# Patient Record
Sex: FEMALE | Race: WHITE | HISPANIC OR LATINO | Employment: UNEMPLOYED | URBAN - METROPOLITAN AREA
[De-identification: names, ages, dates, MRNs, and addresses within clinical notes are randomized per-mention and may not be internally consistent; named-entity substitution may affect disease eponyms.]

---
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
## 2024-01-06 ENCOUNTER — OFFICE VISIT (OUTPATIENT)
Dept: FAMILY MEDICINE | Facility: CLINIC | Age: 19
End: 2024-01-06
Payer: COMMERCIAL

## 2024-01-06 VITALS
WEIGHT: 196 LBS | HEART RATE: 54 BPM | DIASTOLIC BLOOD PRESSURE: 82 MMHG | RESPIRATION RATE: 14 BRPM | OXYGEN SATURATION: 100 % | SYSTOLIC BLOOD PRESSURE: 125 MMHG | TEMPERATURE: 97.8 F

## 2024-01-06 DIAGNOSIS — R23.8 SKIN IRRITATION: Primary | ICD-10-CM

## 2024-01-06 PROCEDURE — 99203 OFFICE O/P NEW LOW 30 MIN: CPT | Performed by: FAMILY MEDICINE

## 2024-01-06 NOTE — PROGRESS NOTES
Assessment & Plan     Skin irritation    No signs of infection noted today.    Recommend keeping area clean and dry.  Close Follow-up if no change or new or worsening sx prn.    Shannon Banegas MD  [facility]    Nettie Beard is a 18 year old, presenting for the following health issues:  Infection (DRAINAGE AROUND NAVEL )      HPI     Presents with friend- room reeks of THC when I enter.  States increased irritation around belly button x 2 weeks.  No piercings.  States the irritation has been coming and going.  Today it looks good per patient.  No redness or streaking.  No fevers.    Review of Systems   Constitutional, HEENT, cardiovascular, pulmonary, GI, , musculoskeletal, neuro, skin, endocrine and psych systems are negative, except as otherwise noted.      Objective    /82   Pulse 54   Temp 97.8  F (36.6  C) (Oral)   Resp 14   Wt 88.9 kg (196 lb)   SpO2 100%   There is no height or weight on file to calculate BMI.  Physical Exam   GENERAL: healthy, alert and no distress  EYES: Eyes grossly normal to inspection, PERRL and conjunctivae and sclerae normal  SKIN: normal umbilicus with no redness or drainage, no streaking noted around, no pain.  PSYCH: mentation appears normal, affect normal/bright

## 2024-06-30 ENCOUNTER — HOSPITAL ENCOUNTER (EMERGENCY)
Facility: CLINIC | Age: 19
Discharge: HOME | End: 2024-06-30
Attending: STUDENT IN AN ORGANIZED HEALTH CARE EDUCATION/TRAINING PROGRAM | Admitting: STUDENT IN AN ORGANIZED HEALTH CARE EDUCATION/TRAINING PROGRAM
Payer: COMMERCIAL

## 2024-06-30 VITALS
SYSTOLIC BLOOD PRESSURE: 140 MMHG | TEMPERATURE: 98 F | DIASTOLIC BLOOD PRESSURE: 80 MMHG | BODY MASS INDEX: 27.49 KG/M2 | WEIGHT: 165 LBS | HEART RATE: 107 BPM | HEIGHT: 65 IN | OXYGEN SATURATION: 100 % | RESPIRATION RATE: 16 BRPM

## 2024-06-30 DIAGNOSIS — S39.92XA INJURY OF COCCYX, INITIAL ENCOUNTER: ICD-10-CM

## 2024-06-30 PROCEDURE — 250N000013 HC RX MED GY IP 250 OP 250 PS 637: Performed by: STUDENT IN AN ORGANIZED HEALTH CARE EDUCATION/TRAINING PROGRAM

## 2024-06-30 PROCEDURE — 99283 EMERGENCY DEPT VISIT LOW MDM: CPT

## 2024-06-30 RX ORDER — ACETAMINOPHEN 325 MG/1
975 TABLET ORAL ONCE
Status: COMPLETED | OUTPATIENT
Start: 2024-06-30 | End: 2024-06-30

## 2024-06-30 RX ORDER — IBUPROFEN 600 MG/1
600 TABLET, FILM COATED ORAL ONCE
Status: COMPLETED | OUTPATIENT
Start: 2024-06-30 | End: 2024-06-30

## 2024-06-30 RX ADMIN — IBUPROFEN 600 MG: 600 TABLET ORAL at 01:19

## 2024-06-30 RX ADMIN — ACETAMINOPHEN 975 MG: 325 TABLET ORAL at 01:19

## 2024-06-30 ASSESSMENT — COLUMBIA-SUICIDE SEVERITY RATING SCALE - C-SSRS
2. HAVE YOU ACTUALLY HAD ANY THOUGHTS OF KILLING YOURSELF IN THE PAST MONTH?: NO
6. HAVE YOU EVER DONE ANYTHING, STARTED TO DO ANYTHING, OR PREPARED TO DO ANYTHING TO END YOUR LIFE?: NO
1. IN THE PAST MONTH, HAVE YOU WISHED YOU WERE DEAD OR WISHED YOU COULD GO TO SLEEP AND NOT WAKE UP?: NO

## 2024-06-30 ASSESSMENT — ACTIVITIES OF DAILY LIVING (ADL): ADLS_ACUITY_SCORE: 33

## 2024-06-30 NOTE — DISCHARGE INSTRUCTIONS
As we discussed, I suspect you have a tailbone injury.  This takes quite some time to heal.  Make sure you take ibuprofen and Tylenol at home for pain as well as ice your backside.  Follow-up with [facility] if things worsen in the next week or so.    Please take 500mg of tylenol every 4 hours as well as 600mg of ibuprofen every 6 hours for pain. Do not take more than 4,000mg of tylenol in 24hrs.

## 2024-06-30 NOTE — ED PROVIDER NOTES
Emergency Department Encounter         FINAL IMPRESSION:  Tailbone injury          ED COURSE AND MEDICAL DECISION MAKING          Healthy 19-year-old here after she slipped on some stairs falling onto her tailbone complaining of pain.  Initially had some mild paresthesias in her inner thighs after the event however no persistent neurocomplaints.  No fevers chills nausea vomiting.  Also complaining of left elbow pain.  No head injury or neck pain.    On arrival she looks well.  Vitals are stable.  Ambulatory.  Patient with very distal coccygeal/tailbone pain.  No midline sacral or lumbosacral back pain.  No midline thoracic or cervical pain.  She is a small scrape to her left elbow with full range of motion of the joint with no significant pain to palpation of the bony prominences.  Wrist and shoulder normal in that side.  Normal pulses.  Soft compartments.    Extensive bedside discussion regarding tailbone injuries.  Unlikely any other significant neuro injuries including central cord.  She has no other significant hard neurologic signs today including midline pain, paresthesias and also has normal strength.  No significant concerns for lumbar fracture.  I do suspect she injured her coccyx.  Again, no other indication for further imaging today.  Will have her follow-up with outpatient orthopedics.  As far as her elbow, no bony prominence pain, and with full range of motion with no pain, I suspect is a superficial injury.                         Medical Decision Making  Obtained supplemental history:Supplemental history obtained?: No  Reviewed external records: External records reviewed?: No  Care impacted by chronic illness:N/A  Care significantly affected by social determinants of health:N/A  Did you consider but not order tests?: Work up considered but not performed and documented in chart, if applicable  Did you interpret images independently?: Independent interpretation of ECG and images noted in  documentation, when applicable.  Consultation discussion with other provider:Did you involve another provider (consultant, , pharmacy, etc.)?: No  Discharge. No recommendations on prescription strength medication(s). See documentation for any additional details.              EKG        Critical Care     Performed by: Herbert Kim or    Authorized by: Herbert Kim  Total critical care time:  minutes  Critical care was necessary to treat or prevent imminent or life-threatening deterioration of the following conditions:   Critical care was time spent personally by me on the following activities: development of treatment plan with patient or surrogate, discussions with consultants, examination of patient, evaluation of patient's response to treatment, obtaining history from patient or surrogate, ordering and performing treatments and interventions, ordering and review of laboratory studies, ordering and review of radiographic studies, re-evaluation of patient's condition and monitoring for potential decompensation.  Critical care time was exclusive of separately billable procedures and treating other patients.'    At the conclusion of the encounter I discussed the results of all the tests and the disposition. The questions were answered. The patient or family acknowledged understanding and was agreeable with the care plan.        MEDICATIONS GIVEN IN THE EMERGENCY DEPARTMENT:  Medications   ibuprofen (ADVIL/MOTRIN) tablet 600 mg (has no administration in time range)   acetaminophen (TYLENOL) tablet 975 mg (has no administration in time range)       NEW PRESCRIPTIONS STARTED AT TODAY'S ED VISIT:  New Prescriptions    No medications on file       HPI     19-year-old healthy female here after she slipped injuring her tailbone.  Had a brief episode of paresthesias in her middle thighs however this was not consistent and constant and was a few seconds after the injury.  Complaining of coccygeal pain now.  No lumbosacral back pain.  " No cervical pain.  No head injury.  Also complaining of a scrape of her left elbow.  Normal wrist and shoulder.        MEDICAL HISTORY     Past Medical History:   Diagnosis Date    Anxiety 10/5/2021    Mild intermittent asthma without complication 2/22/2017    Moderate episode of recurrent major depressive disorder (H) 10/5/2021       No past surgical history on file.    Social History     Tobacco Use    Smoking status: Never    Smokeless tobacco: Never       oxyCODONE (ROXICODONE) 5 MG tablet  VENTOLIN  (90 Base) MCG/ACT inhaler            PHYSICAL EXAM     BP (!) 140/80   Pulse 107   Temp 98  F (36.7  C)   Resp 16   Ht 1.651 m (5' 5\")   Wt 74.8 kg (165 lb)   SpO2 100%   BMI 27.46 kg/m        PHYSICAL EXAM:     General: Patient appears well, nontoxic, comfortable  HEENT: Moist mucous membranes,  No head trauma.    Cardiovascular: Normal rate, normal rhythm, no extremity edema.  No appreciable murmur.  Respiratory: No signs of respiratory distress, lungs are clear to auscultation bilaterally with no wheezes rhonchi or rales.  Abdominal: Soft, nontender, nondistended, no palpable masses, no guarding, no rebound  Musculoskeletal: Full range of motion of joints, no deformities appreciated.Full range of motion of the left elbow, left shoulder and left wrist.  Small superficial abrasion to left elbow.  No significant pain at bony prominence palpation.  Pain to palpation of her coccygeal region with no significant skin changes.  No sacral or lumbosacral back pain with midline palpation, normal thoracic and cervical spine.  Full sensation in distal extremities.  Normal strength.  Normal pulses.  Neurological: Alert and oriented, grossly neurologically intact.    Psychological: Normal affect and mood.  Integument: No rashes appreciated          RESULTS       Labs Ordered and Resulted from Time of ED Arrival to Time of ED Departure - No data to display    No orders to display "         PROCEDURES:  Procedures:  Procedures         Herbert Kim DO  Emergency Medicine  [facility] EMERGENCY ROOM       Herbert Kim DO  06/30/24 0120

## 2024-06-30 NOTE — ED TRIAGE NOTES
Pt reports slipping and falling onto her tailbone causing severe pain. Pt also notes a numbness sensation to thighs, but only while sitting.     Triage Assessment (Adult)       Row Name 06/30/24 0055          Triage Assessment    Airway WDL WDL        Respiratory WDL    Respiratory WDL WDL        Skin Circulation/Temperature WDL    Skin Circulation/Temperature WDL WDL        Cardiac WDL    Cardiac WDL WDL        Peripheral/Neurovascular WDL    Peripheral Neurovascular WDL WDL        Cognitive/Neuro/Behavioral WDL    Cognitive/Neuro/Behavioral WDL WDL